# Patient Record
Sex: MALE | Race: WHITE | NOT HISPANIC OR LATINO | URBAN - METROPOLITAN AREA
[De-identification: names, ages, dates, MRNs, and addresses within clinical notes are randomized per-mention and may not be internally consistent; named-entity substitution may affect disease eponyms.]

---

## 2017-02-15 ENCOUNTER — EMERGENCY (EMERGENCY)
Facility: HOSPITAL | Age: 37
LOS: 1 days | Discharge: HOME | End: 2017-02-15
Attending: EMERGENCY MEDICINE
Payer: COMMERCIAL

## 2017-02-15 VITALS
HEIGHT: 69 IN | OXYGEN SATURATION: 97 % | RESPIRATION RATE: 20 BRPM | SYSTOLIC BLOOD PRESSURE: 118 MMHG | HEART RATE: 72 BPM | TEMPERATURE: 98 F | WEIGHT: 225.09 LBS | DIASTOLIC BLOOD PRESSURE: 65 MMHG

## 2017-02-15 DIAGNOSIS — F17.210 NICOTINE DEPENDENCE, CIGARETTES, UNCOMPLICATED: ICD-10-CM

## 2017-02-15 DIAGNOSIS — R22.1 LOCALIZED SWELLING, MASS AND LUMP, NECK: ICD-10-CM

## 2017-02-15 DIAGNOSIS — Z88.6 ALLERGY STATUS TO ANALGESIC AGENT: ICD-10-CM

## 2017-02-15 PROCEDURE — 99283 EMERGENCY DEPT VISIT LOW MDM: CPT

## 2017-02-15 PROCEDURE — 99284 EMERGENCY DEPT VISIT MOD MDM: CPT | Mod: 25

## 2017-02-15 RX ORDER — DIPHENHYDRAMINE HCL 50 MG
50 CAPSULE ORAL ONCE
Qty: 0 | Refills: 0 | Status: COMPLETED | OUTPATIENT
Start: 2017-02-15 | End: 2017-02-15

## 2017-02-15 RX ADMIN — Medication 50 MILLIGRAM(S): at 08:53

## 2017-02-15 NOTE — ED PROVIDER NOTE - MEDICAL DECISION MAKING DETAILS
37 y/o M w/ questionable throat swelling & Hx of angioedema. Will give Benadryl, Prednisone & will observe pt & reassess.

## 2017-02-15 NOTE — ED PROVIDER NOTE - OBJECTIVE STATEMENT
35 y/o M w/ PMHx of Angioedema presents to the ED c/o throat swelling onset today at 03:00. Pt states he took Benadryl which relieved Sx temporarily but states Sx later returned. Pt denies fever, chills, drooling, throat pain, SOB, abd pain, leg swelling or any other complaints. Pt is allergic to Aspirin.

## 2017-02-15 NOTE — ED PROVIDER NOTE - NS ED MD SCRIBE ATTENDING SCRIBE SECTIONS
VITAL SIGNS( Pullset)/REVIEW OF SYSTEMS/PHYSICAL EXAM/HIV/HISTORY OF PRESENT ILLNESS/PAST MEDICAL/SURGICAL/SOCIAL HISTORY/DISPOSITION

## 2019-11-12 ENCOUNTER — EMERGENCY (EMERGENCY)
Facility: HOSPITAL | Age: 39
LOS: 1 days | Discharge: HOME | End: 2019-11-12
Attending: EMERGENCY MEDICINE
Payer: COMMERCIAL

## 2019-11-12 VITALS
OXYGEN SATURATION: 96 % | TEMPERATURE: 98 F | RESPIRATION RATE: 20 BRPM | DIASTOLIC BLOOD PRESSURE: 76 MMHG | HEIGHT: 69 IN | SYSTOLIC BLOOD PRESSURE: 111 MMHG | HEART RATE: 114 BPM | WEIGHT: 240.08 LBS

## 2019-11-12 VITALS
OXYGEN SATURATION: 99 % | HEART RATE: 99 BPM | SYSTOLIC BLOOD PRESSURE: 128 MMHG | RESPIRATION RATE: 19 BRPM | DIASTOLIC BLOOD PRESSURE: 81 MMHG | TEMPERATURE: 98 F

## 2019-11-12 PROCEDURE — 99284 EMERGENCY DEPT VISIT MOD MDM: CPT | Mod: 25

## 2019-11-12 PROCEDURE — 99284 EMERGENCY DEPT VISIT MOD MDM: CPT

## 2019-11-12 PROCEDURE — 96372 THER/PROPH/DIAG INJ SC/IM: CPT | Mod: XU

## 2019-11-12 PROCEDURE — 96375 TX/PRO/DX INJ NEW DRUG ADDON: CPT

## 2019-11-12 PROCEDURE — 96374 THER/PROPH/DIAG INJ IV PUSH: CPT

## 2019-11-12 RX ORDER — SODIUM CHLORIDE 9 MG/ML
3 INJECTION INTRAMUSCULAR; INTRAVENOUS; SUBCUTANEOUS ONCE
Refills: 0 | Status: COMPLETED | OUTPATIENT
Start: 2019-11-12 | End: 2019-11-12

## 2019-11-12 RX ORDER — DIPHENHYDRAMINE HCL 50 MG
50 CAPSULE ORAL ONCE
Refills: 0 | Status: COMPLETED | OUTPATIENT
Start: 2019-11-12 | End: 2019-11-12

## 2019-11-12 RX ORDER — DIPHENHYDRAMINE HCL 50 MG
1 CAPSULE ORAL
Qty: 15 | Refills: 0
Start: 2019-11-12 | End: 2019-11-16

## 2019-11-12 RX ORDER — FAMOTIDINE 10 MG/ML
20 INJECTION INTRAVENOUS ONCE
Refills: 0 | Status: COMPLETED | OUTPATIENT
Start: 2019-11-12 | End: 2019-11-12

## 2019-11-12 RX ORDER — EPINEPHRINE 0.3 MG/.3ML
0.3 INJECTION INTRAMUSCULAR; SUBCUTANEOUS ONCE
Refills: 0 | Status: COMPLETED | OUTPATIENT
Start: 2019-11-12 | End: 2019-11-12

## 2019-11-12 RX ORDER — FAMOTIDINE 10 MG/ML
1 INJECTION INTRAVENOUS
Qty: 5 | Refills: 0
Start: 2019-11-12 | End: 2019-11-16

## 2019-11-12 RX ADMIN — SODIUM CHLORIDE 3 MILLILITER(S): 9 INJECTION INTRAMUSCULAR; INTRAVENOUS; SUBCUTANEOUS at 01:07

## 2019-11-12 RX ADMIN — Medication 50 MILLIGRAM(S): at 01:00

## 2019-11-12 RX ADMIN — EPINEPHRINE 0.3 MILLIGRAM(S): 0.3 INJECTION INTRAMUSCULAR; SUBCUTANEOUS at 00:54

## 2019-11-12 RX ADMIN — Medication 125 MILLIGRAM(S): at 00:56

## 2019-11-12 RX ADMIN — FAMOTIDINE 20 MILLIGRAM(S): 10 INJECTION INTRAVENOUS at 00:56

## 2019-11-12 NOTE — ED ADULT TRIAGE NOTE - CHIEF COMPLAINT QUOTE
Pt stated that he went to and urgent care for muscle spasm took cyclobenzaprine, Ibuprofen and Zyrtec at 8:30 pm last night now compliant of having hives, tongue is swollen, redness and itchiness. Pt talking in complete sentences no drooling noted.

## 2019-11-12 NOTE — ED PROVIDER NOTE - CLINICAL SUMMARY MEDICAL DECISION MAKING FREE TEXT BOX
Pt is a 39y M with angioedema with unknown triggers presenting to the ED with angioedema and skin rash after taking flexeril. Possible allergy. Given IM epi and IV Pepcid, solumedrol, Benadryl. Will check for improvement. Pt is a 39y M with angioedema with unknown triggers presenting to the ED with angioedema and skin rash after taking flexeril. Possibly allergic reaction. Given IM epi and IV Pepcid, solumedrol, Benadryl. Will check for improvement.    On reeval at 345am lip/tongue edema resolved. Patient well-appearing stable for discharge with careful return to ED precautions.

## 2019-11-12 NOTE — ED ADULT NURSE NOTE - NSIMPLEMENTINTERV_GEN_ALL_ED
Implemented All Universal Safety Interventions:  [place] to call system. Call bell, personal items and telephone within reach. Instruct patient to call for assistance. Room bathroom lighting operational. Non-slip footwear when patient is off stretcher. Physically safe environment: no spills, clutter or unnecessary equipment. Stretcher in lowest position, wheels locked, appropriate side rails in place.

## 2019-11-12 NOTE — ED PROVIDER NOTE - PATIENT PORTAL LINK FT
You can access the FollowMyHealth Patient Portal offered by [facility] by registering at the following website: [email or web address]. By joining [facility]’s FollowMyHealth portal, you will also be able to view your health information using other applications (apps) compatible with our system.

## 2019-11-12 NOTE — ED PROVIDER NOTE - OBJECTIVE STATEMENT
Pt is a 39y M with significant PMHx of angioedema with unknown triggers and no significant PSHx presenting to the ED with Rt tongue swelling and skin rash. Pt reports that earlier today he pulled muscle on L flank and went to urgent care 8:30pm. He took flexeril, ibuprofen, zyrtec and developed lower lip and tongue swelling within an hour. Pt denies any difficulty breathing, no wheezing, no nausea, no vomiting. Pt takes daily zyrtec for angioedema. Pt reports to have allergy testing in past but didn't find any trigger. No family history of angioedema. Pt has allergy to aspirin and currently denies any additional complaints at this time.

## 2019-11-12 NOTE — ED PROVIDER NOTE - NSFOLLOWUPINSTRUCTIONS_ED_ALL_ED_FT
Take medications as prescribed.  Followup with PMD for reevaluation.    Return to ED if you develop difficulty breathing or severe tongue swelling.

## 2020-07-20 NOTE — ED PROVIDER NOTE - NEUROLOGICAL, MLM
Patient phoned regarding an appointment with [facility] for survivorship care planning and wellness appt. Patient declines interest in pursing an appt at this time. AYDEN Romo.    Alert and oriented, no focal deficits, no motor or sensory deficits.

## 2021-11-19 NOTE — ED PROVIDER NOTE - CARE PLAN
To get better and follow your care plan as instructed.
Principal Discharge DX:	Angioedema, initial encounter  Secondary Diagnosis:	Allergic reaction, initial encounter

## 2024-10-15 NOTE — ED ADULT NURSE NOTE - DISCHARGE DATE/TIME
Mild BP control by increasing chlorthalidone to 25 mg daily.  Repeat metabolic panel in 3 to 4 weeks.  Orders:    chlorthalidone (Hygroton) 25 mg tablet; Take 1 tablet (25 mg) by mouth once daily.    Comprehensive Metabolic Panel; Future     12-Nov-2019 04:10